# Patient Record
Sex: FEMALE | Race: WHITE
[De-identification: names, ages, dates, MRNs, and addresses within clinical notes are randomized per-mention and may not be internally consistent; named-entity substitution may affect disease eponyms.]

---

## 2017-03-02 ENCOUNTER — RECORDS - HEALTHEAST (OUTPATIENT)
Dept: ADMINISTRATIVE | Facility: OTHER | Age: 22
End: 2017-03-02

## 2018-05-29 ENCOUNTER — TRANSFERRED RECORDS (OUTPATIENT)
Dept: HEALTH INFORMATION MANAGEMENT | Facility: CLINIC | Age: 23
End: 2018-05-29

## 2018-05-29 LAB
C TRACH DNA SPEC QL PROBE+SIG AMP: NEGATIVE
CHOLEST SERPL-MCNC: 173 MG/DL (ref 100–199)
CREAT SERPL-MCNC: 0.82 MG/DL (ref 0.57–1.11)
GFR SERPL CREATININE-BSD FRML MDRD: >60 ML/MIN/1.73M2
GLUCOSE SERPL-MCNC: 88 MG/DL (ref 65–100)
HDLC SERPL-MCNC: 42 MG/DL
LDLC SERPL CALC-MCNC: 112 MG/DL
N GONORRHOEA DNA SPEC QL PROBE+SIG AMP: NEGATIVE
NONHDLC SERPL-MCNC: 131 MG/DL
POTASSIUM SERPL-SCNC: 4.1 MMOL/L (ref 3.5–5)
SPECIMEN DESCRIP: NORMAL
SPECIMEN DESCRIPTION: NORMAL
TRIGL SERPL-MCNC: 95 MG/DL

## 2019-06-06 NOTE — PROGRESS NOTES
SUBJECTIVE:   CC: Saritha Abel is an 23 year old woman who presents for preventive health visit.     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  Fam hx of breast cancer: no.   Colonoscopy: n/a. Fam hx of colon cancer: no  Pap: Last: 02/2017. Hx of abnormal: no  Sexually active: last 1 year ago. STD screening: yes- done 05/2018- negative.     Periods/menopause/contraception: OCP for years.   No LMP recorded (lmp unknown). (Menstrual status: Birth Control). Takes continuously, plans period every 9 weeks. 5 days bleeding. No bad cramps.   PCB, pelvic pain, dyspareunia, vaginal discharge: no  Some itching externally at times- sporadic.     Interested in the Mirena IUD.     Hx of anxiety   Used to be lexapro and then changed to sertraline.   Stopped due to side effects.  A few things in life are more calm. So weaned off and doing well off. Off past 5 months.   Now she is sleeping well. Mood is good. Sometimes irritable at times..     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   Is in school at [employer]  Needs TB blood test today- requesting that be drawn.    For exercise: lifting weights 3-5d per week, some cardio; feels good with exercise. Limitations with exercise due to: nothing. Denies CV sxs with exercise including CP, SOB, palpitations, CRANE, presyncope.    Lipids: ALlina 05/29/2018      Today's PHQ-2 Score:   PHQ-2 ( 1999 Pfizer) 6/10/2019   Q1: Little interest or pleasure in doing  things 0   Q2: Feeling down, depressed or hopeless 0   PHQ-2 Score 0   Q1: Little interest or pleasure in doing things Not at all   Q2: Feeling down, depressed or hopeless Not at all   PHQ-2 Score 0       Abuse: Current or Past(Physical, Sexual or Emotional)- No  Do you feel safe in your environment? Yes    Social History     Tobacco Use     Smoking status: Never Smoker     Smokeless tobacco: Never Used   Substance Use Topics     Alcohol use: Yes     Comment: 1 drink per week          Alcohol Use 6/10/2019   Prescreen: >3 drinks/day or >7 drinks/week? No       Reviewed orders with patient.  Reviewed health maintenance and updated orders accordingly - Yes  Lab work is in process  Labs reviewed in EPIC  BP Readings from Last 3 Encounters:   06/10/19 106/58    Wt Readings from Last 3 Encounters:   06/10/19 65.6 kg (144 lb 11.2 oz)                  There is no problem list on file for this patient.    Past Surgical History:   Procedure Laterality Date     DENTAL SURGERY      wisdom teeth       Social History     Tobacco Use     Smoking status: Never Smoker     Smokeless tobacco: Never Used   Substance Use Topics     Alcohol use: Yes     Comment: 1 drink per week      Family History   Problem Relation Age of Onset     Liver Disease Paternal Grandfather      Diabetes No family hx of      Hypertension No family hx of          Current Outpatient Medications   Medication Sig Dispense Refill     ALAYCEN 1/35 1-35 MG-MCG tablet Take 1 tablet by mouth daily To take continuously 112 tablet 4     Allergies   Allergen Reactions     Amoxicillin      Yeast infections.       Mammogram not appropriate for this patient based on age.    Pertinent mammograms are reviewed under the imaging tab.  History of abnormal Pap smear: NO - age 21-29 PAP every 3 years recommended     Reviewed and updated as needed this visit by clinical staff  Tobacco  Allergies  Meds  Med Hx  Surg Hx  Fam Hx  Soc Hx        Reviewed and updated as needed this  "visit by Provider          Review of Systems   Constitutional: Negative for chills and fever.   HENT: Negative for congestion, ear pain, hearing loss and sore throat.    Eyes: Negative for pain and visual disturbance.   Respiratory: Negative for cough and shortness of breath.    Cardiovascular: Negative for chest pain, palpitations and peripheral edema.   Gastrointestinal: Negative for abdominal pain, constipation, diarrhea, heartburn, hematochezia and nausea.   Breasts:  Negative for tenderness, breast mass and discharge.   Genitourinary: Negative for dysuria, frequency, genital sores, hematuria, pelvic pain, urgency, vaginal bleeding and vaginal discharge.   Musculoskeletal: Negative for arthralgias, joint swelling and myalgias.   Skin: Negative for rash.   Neurological: Negative for dizziness, weakness, headaches and paresthesias.   Psychiatric/Behavioral: Negative for mood changes. The patient is nervous/anxious.         OBJECTIVE:   /58   Pulse 79   Temp 98.7  F (37.1  C) (Oral)   Resp 18   Ht 1.638 m (5' 4.5\")   Wt 65.6 kg (144 lb 11.2 oz)   LMP  (LMP Unknown)   SpO2 98%   Breastfeeding? No   BMI 24.45 kg/m    Physical Exam  GENERAL: healthy, alert and no distress  EYES: Eyes grossly normal to inspection, PERRL and conjunctivae and sclerae normal  HENT: ear canals and TM's normal, nose and mouth without ulcers or lesions  NECK: no adenopathy, no asymmetry, masses, or scars and thyroid normal to palpation  RESP: lungs clear to auscultation - no rales, rhonchi or wheezes  BREAST: nipple piercing bilaterally without erythema, crusting or signs of infection; normal without masses, tenderness or nipple discharge and no palpable axillary masses or adenopathy  CV: regular rate and rhythm, normal S1 S2, no S3 or S4, no murmur, click or rub, no peripheral edema and peripheral pulses strong  ABDOMEN: soft, nontender, no hepatosplenomegaly, no masses and bowel sounds normal   (female): normal female " external genitalia, normal urethral meatus, vaginal mucosa pink, moist, well rugated, and normal cervix/adnexa/uterus without masses or discharge  MS: no gross musculoskeletal defects noted, no edema  SKIN: no suspicious lesions or rashes  NEURO: Normal strength and tone, mentation intact and speech normal  PSYCH: mentation appears normal, affect normal/bright  LYMPH: normal ant/post cervical, supraclavicular nodes    Diagnostic Test Results:  Results for orders placed or performed in visit on 06/10/19 (from the past 24 hour(s))   Hemoglobin   Result Value Ref Range    Hemoglobin 13.6 11.7 - 15.7 g/dL   Wet prep   Result Value Ref Range    Specimen Description Vagina     Wet Prep No Trichomonas seen     Wet Prep Clue cells seen  Few   (A)     Wet Prep No yeast seen     Wet Prep WBC'S seen  Few          ASSESSMENT/PLAN:   1. Routine general medical examination at a health care facility  Pap and STD screening as below  Counseling as below  - NEISSERIA GONORRHOEA PCR  - CHLAMYDIA TRACHOMATIS PCR  - HIV Screening  - Pap imaged thin layer screen only - recommended age 21 - 24 years  - Treponema Abs w Reflex to RPR and Titer  - Hemoglobin  - Wet prep    2. Screening for malignant neoplasm of cervix  Obtained today   - Pap imaged thin layer screen only - recommended age 21 - 24 years    3. Screening for hyperlipidemia  Reviewed lipids from 05/28/2018 from her previous [facility].     4. Screen for STD (sexually transmitted disease)  Safe sex practices discussed and advised. STD screening as below.   - NEISSERIA GONORRHOEA PCR  - CHLAMYDIA TRACHOMATIS PCR  - HIV Screening  - Treponema Abs w Reflex to RPR and Titer  - Wet prep    5. Encounter for surveillance of contraceptive pills  Refilled OCP. Risks vs benefits. Taking continuously.  Pt interested in Mirena IUD as alternate- discussed IUD, insertion, timing for insertion.   - ALAYCEN 1/35 1-35 MG-MCG tablet; Take 1 tablet by mouth daily To take continuously  Dispense:  "112 tablet; Refill: 4    6. Tuberculosis screening  Pt needs for work as LPN.   She will bring in form to be completed   - Quantiferon TB Gold Plus    7. BV (bacterial vaginosis)  Treat as below  - metroNIDAZOLE (FLAGYL) 500 MG tablet; Take 1 tablet (500 mg) by mouth 2 times daily for 7 days  Dispense: 14 tablet; Refill: 0      COUNSELING:  Reviewed preventive health counseling, as reflected in patient instructions       Regular exercise       Healthy diet/nutrition       Contraception       Safe sex practices/STD prevention    Estimated body mass index is 24.45 kg/m  as calculated from the following:    Height as of this encounter: 1.638 m (5' 4.5\").    Weight as of this encounter: 65.6 kg (144 lb 11.2 oz).         reports that she has never smoked. She has never used smokeless tobacco.      Counseling Resources:  ATP IV Guidelines  Pooled Cohorts Equation Calculator  Breast Cancer Risk Calculator  FRAX Risk Assessment  ICSI Preventive Guidelines  Dietary Guidelines for Americans, 2010  [employer]ero's MyPlate  ASA Prophylaxis  Lung CA Screening    Lavinia Donohue PA-C  [facility]  "

## 2019-06-10 ENCOUNTER — OFFICE VISIT (OUTPATIENT)
Dept: FAMILY MEDICINE | Facility: CLINIC | Age: 24
End: 2019-06-10
Payer: COMMERCIAL

## 2019-06-10 VITALS
BODY MASS INDEX: 24.11 KG/M2 | OXYGEN SATURATION: 98 % | RESPIRATION RATE: 18 BRPM | TEMPERATURE: 98.7 F | HEART RATE: 79 BPM | WEIGHT: 144.7 LBS | HEIGHT: 65 IN | SYSTOLIC BLOOD PRESSURE: 106 MMHG | DIASTOLIC BLOOD PRESSURE: 58 MMHG

## 2019-06-10 DIAGNOSIS — N76.0 BV (BACTERIAL VAGINOSIS): ICD-10-CM

## 2019-06-10 DIAGNOSIS — Z13.220 SCREENING FOR HYPERLIPIDEMIA: ICD-10-CM

## 2019-06-10 DIAGNOSIS — Z00.00 ROUTINE GENERAL MEDICAL EXAMINATION AT A HEALTH CARE FACILITY: Primary | ICD-10-CM

## 2019-06-10 DIAGNOSIS — Z30.41 ENCOUNTER FOR SURVEILLANCE OF CONTRACEPTIVE PILLS: ICD-10-CM

## 2019-06-10 DIAGNOSIS — Z12.4 SCREENING FOR MALIGNANT NEOPLASM OF CERVIX: ICD-10-CM

## 2019-06-10 DIAGNOSIS — B96.89 BV (BACTERIAL VAGINOSIS): ICD-10-CM

## 2019-06-10 DIAGNOSIS — Z11.1 TUBERCULOSIS SCREENING: ICD-10-CM

## 2019-06-10 DIAGNOSIS — Z11.3 SCREEN FOR STD (SEXUALLY TRANSMITTED DISEASE): ICD-10-CM

## 2019-06-10 LAB
HGB BLD-MCNC: 13.6 G/DL (ref 11.7–15.7)
SPECIMEN SOURCE: ABNORMAL
WET PREP SPEC: ABNORMAL

## 2019-06-10 PROCEDURE — 87491 CHLMYD TRACH DNA AMP PROBE: CPT | Performed by: PHYSICIAN ASSISTANT

## 2019-06-10 PROCEDURE — 87210 SMEAR WET MOUNT SALINE/INK: CPT | Performed by: PHYSICIAN ASSISTANT

## 2019-06-10 PROCEDURE — 86481 TB AG RESPONSE T-CELL SUSP: CPT | Performed by: PHYSICIAN ASSISTANT

## 2019-06-10 PROCEDURE — 36415 COLL VENOUS BLD VENIPUNCTURE: CPT | Performed by: PHYSICIAN ASSISTANT

## 2019-06-10 PROCEDURE — G0145 SCR C/V CYTO,THINLAYER,RESCR: HCPCS | Performed by: PHYSICIAN ASSISTANT

## 2019-06-10 PROCEDURE — 86780 TREPONEMA PALLIDUM: CPT | Performed by: PHYSICIAN ASSISTANT

## 2019-06-10 PROCEDURE — 85018 HEMOGLOBIN: CPT | Performed by: PHYSICIAN ASSISTANT

## 2019-06-10 PROCEDURE — 99385 PREV VISIT NEW AGE 18-39: CPT | Performed by: PHYSICIAN ASSISTANT

## 2019-06-10 PROCEDURE — 87389 HIV-1 AG W/HIV-1&-2 AB AG IA: CPT | Performed by: PHYSICIAN ASSISTANT

## 2019-06-10 PROCEDURE — 87591 N.GONORRHOEAE DNA AMP PROB: CPT | Performed by: PHYSICIAN ASSISTANT

## 2019-06-10 RX ORDER — NORETHINDRONE AND ETHINYL ESTRADIOL 1 MG-35MCG
KIT ORAL
Refills: 4 | COMMUNITY
Start: 2019-03-23 | End: 2019-06-10

## 2019-06-10 RX ORDER — NORETHINDRONE AND ETHINYL ESTRADIOL 1 MG-35MCG
1 KIT ORAL DAILY
Qty: 112 TABLET | Refills: 4 | Status: SHIPPED | OUTPATIENT
Start: 2019-06-10 | End: 2020-06-16

## 2019-06-10 ASSESSMENT — ENCOUNTER SYMPTOMS
FEVER: 0
PARESTHESIAS: 0
DIARRHEA: 0
JOINT SWELLING: 0
FREQUENCY: 0
PALPITATIONS: 0
EYE PAIN: 0
MYALGIAS: 0
CONSTIPATION: 0
DYSURIA: 0
NAUSEA: 0
HEMATURIA: 0
CHILLS: 0
HEARTBURN: 0
SORE THROAT: 0
HEADACHES: 0
SHORTNESS OF BREATH: 0
NERVOUS/ANXIOUS: 1
COUGH: 0
BREAST MASS: 0
ABDOMINAL PAIN: 0
DIZZINESS: 0
ARTHRALGIAS: 0
WEAKNESS: 0
HEMATOCHEZIA: 0

## 2019-06-10 ASSESSMENT — PAIN SCALES - GENERAL: PAINLEVEL: NO PAIN (0)

## 2019-06-10 ASSESSMENT — MIFFLIN-ST. JEOR: SCORE: 1404.29

## 2019-06-10 NOTE — PATIENT INSTRUCTIONS
IUD- Leonard Ortiz DNP for insertion when in day 1-2 of period.    STD screening   Preventive Health Recommendations  Female Ages 21 to 25     Yearly exam:     See your health care provider every year in order to  o Review health changes.   o Discuss preventive care.    o Review your medicines if your doctor has prescribed any.      You should be tested each year for STDs (sexually transmitted diseases).       Talk to your provider about how often you should have cholesterol testing.      Get a Pap test every three years. If you have an abnormal result, your doctor may have you test more often.      If you are at risk for diabetes, you should have a diabetes test (fasting glucose).     Shots:     Get a flu shot each year.     Get a tetanus shot every 10 years.     Consider getting the shot (vaccine) that prevents cervical cancer (Gardasil).    Nutrition:     Eat at least 5 servings of fruits and vegetables each day.    Eat whole-grain bread, whole-wheat pasta and brown rice instead of white grains and rice.    Get adequate Calcium and Vitamin D.     Lifestyle    Exercise at least 150 minutes a week each week (30 minutes a day, 5 days a week). This will help you control your weight and prevent disease.    Limit alcohol to one drink per day.    No smoking.     Wear sunscreen to prevent skin cancer.    See your dentist every six months for an exam and cleaning.

## 2019-06-10 NOTE — Clinical Note
Please abstract the following data from this visit with this patient into the appropriate field in Epic:Lipids: 05/29/2018 at [facility]- can see in [facility] EveryWhere

## 2019-06-11 ENCOUNTER — TELEPHONE (OUTPATIENT)
Dept: FAMILY MEDICINE | Facility: CLINIC | Age: 24
End: 2019-06-11

## 2019-06-11 LAB
C TRACH DNA SPEC QL NAA+PROBE: NEGATIVE
HIV 1+2 AB+HIV1 P24 AG SERPL QL IA: NONREACTIVE
N GONORRHOEA DNA SPEC QL NAA+PROBE: NEGATIVE
SPECIMEN SOURCE: NORMAL
SPECIMEN SOURCE: NORMAL
T PALLIDUM AB SER QL: NONREACTIVE

## 2019-06-11 RX ORDER — METRONIDAZOLE 500 MG/1
500 TABLET ORAL 2 TIMES DAILY
Qty: 14 TABLET | Refills: 0 | Status: SHIPPED | OUTPATIENT
Start: 2019-06-11 | End: 2019-08-14

## 2019-06-11 NOTE — TELEPHONE ENCOUNTER
Left message asking patient to return call.  Please inform patient of RESULTS from Provider below.     STD screening is negative - chlamydia, gonorrhea, HIV, syphilis.  Vaginal swab showed over growth of bacteria, or bacterial vaginosis. This is not an STD. It is treated w/oral antibiotic metronidazole twice daily for 7 days (will send to pharmacy). Do not consume any alcohol while taking this antibiotic.   Hemoglobin is normal.  TB blood test and pap still in process.   Lavinia Donohue PA-C

## 2019-06-12 LAB
COPATH REPORT: NORMAL
GAMMA INTERFERON BACKGROUND BLD IA-ACNC: 0.03 IU/ML
M TB IFN-G BLD-IMP: NEGATIVE
M TB IFN-G CD4+ BCKGRND COR BLD-ACNC: 7.33 IU/ML
MITOGEN IGNF BCKGRD COR BLD-ACNC: 0 IU/ML
MITOGEN IGNF BCKGRD COR BLD-ACNC: 0 IU/ML
PAP: NORMAL

## 2019-06-17 ENCOUNTER — MYC MEDICAL ADVICE (OUTPATIENT)
Dept: FAMILY MEDICINE | Facility: CLINIC | Age: 24
End: 2019-06-17

## 2019-06-17 DIAGNOSIS — B37.31 YEAST INFECTION OF THE VAGINA: Primary | ICD-10-CM

## 2019-06-18 RX ORDER — FLUCONAZOLE 150 MG/1
150 TABLET ORAL ONCE
Qty: 1 TABLET | Refills: 0 | Status: SHIPPED | OUTPATIENT
Start: 2019-06-18 | End: 2019-08-14

## 2019-08-12 NOTE — PROGRESS NOTES
Subjective     Saritha Abel is a 23 year old female who presents to clinic today for the following health issues:    HPI   Vaginal Symptoms  Onset: ongoing, was treated but never went away   Was seen 06/2019 for annual. Treated for BV   No new partners. Last intercourse 1 yr ago.   Itching inside right and thinks can feel a bump where the itching is.  No itch on outside.   No discharge.  Changed detergent to sensitive skin.   Tried looser underclothing  Stopped shaving and that didn't help.   Periods regular. Patient's last menstrual period was 07/16/2019 (approximate).   No urinary sx.   Had hpv vaccines    Description:  Vaginal Discharge: none   Itching (Pruritis): YES  Burning sensation:  YES  Odor: no     Accompanying Signs & Symptoms:  Pain with Urination: no   Abdominal Pain: no   Fever: no     History:   Sexually active: not currently- been over a year   New Partner: no   Possibility of Pregnancy:  No    Precipitating factors:   Recent Antibiotic Use: YES- about two months ago.     Alleviating factors:  none    Therapies Tried and outcome: completed course of antibiotic, change underwear, changed degerntant      There is no problem list on file for this patient.    Past Surgical History:   Procedure Laterality Date     DENTAL SURGERY      wisdom teeth       Social History     Tobacco Use     Smoking status: Never Smoker     Smokeless tobacco: Never Used   Substance Use Topics     Alcohol use: Yes     Comment: 1 drink per week      Family History   Problem Relation Age of Onset     Liver Disease Paternal Grandfather      Diabetes No family hx of      Hypertension No family hx of          Current Outpatient Medications   Medication Sig Dispense Refill     ALAYCEN 1/35 1-35 MG-MCG tablet Take 1 tablet by mouth daily To take continuously 112 tablet 4     imiquimod (ALDARA) 5 % external cream Apply topically three times a week At bedtime, wash off in morning. Use up to 16 weeks. 8 packet 1     Allergies  "  Allergen Reactions     Amoxicillin      Yeast infections.     BP Readings from Last 3 Encounters:   08/14/19 100/64   06/10/19 106/58    Wt Readings from Last 3 Encounters:   08/14/19 64.9 kg (143 lb)   06/10/19 65.6 kg (144 lb 11.2 oz)                    Reviewed and updated as needed this visit by Provider         Review of Systems   ROS COMP: Constitutional, HEENT, cardiovascular, pulmonary, gi and gu systems are negative, except as otherwise noted.      Objective    /64   Pulse 66   Temp 98.5  F (36.9  C) (Oral)   Resp 16   Ht 1.64 m (5' 4.57\")   Wt 64.9 kg (143 lb)   LMP 07/16/2019 (Approximate)   SpO2 100%   BMI 24.12 kg/m    There is no height or weight on file to calculate BMI.  Physical Exam   GENERAL: healthy, alert and no distress  ABDOMEN: soft, nontender, no masses and bowel sounds normal   (female): normal female external genitalia, normal labia; vaginal introitus: mucosal surface just right off the posterior midline - 1mm papule at site of itching. Otherwise normal tissue. Normal urethral meatus, vaginal mucosa pink, moist, well rugated, and normal cervix/adnexa/uterus without masses or discharge  NEURO: Normal strength and tone, mentation intact and speech normal  PSYCH: mentation appears normal, affect normal/bright    Diagnostic Test Results:  Labs reviewed in Epic  Results for orders placed or performed in visit on 08/14/19 (from the past 24 hour(s))   Wet prep   Result Value Ref Range    Specimen Description Vagina     Wet Prep No Trichomonas seen     Wet Prep No clue cells seen     Wet Prep No yeast seen     Wet Prep No WBC's seen            Assessment & Plan     1. Skin lesion  Itchy papular skin lesion at vaginal introitus  Treat for genital wart as below. Discussed how to use, apply, possible side effects. If not resolving as expected to GYN.   - imiquimod (ALDARA) 5 % external cream; Apply topically three times a week At bedtime, wash off in morning. Use up to 16 weeks.  " Dispense: 8 packet; Refill: 1    2. Vaginal itching  - Wet prep       Follow Up: The patient was instructed to contact clinic for worsening symptoms, non-improvement as expected/discussed, and for questions regarding medications or treatment plan. Discussed parameters for follow up and included in After Visit Summary given to patient.      Return in about 3 months (around 11/14/2019) for with gyn if not resolving or changing/growing.    Lavinia Donohue PA-C  [facility]

## 2019-08-14 ENCOUNTER — OFFICE VISIT (OUTPATIENT)
Dept: FAMILY MEDICINE | Facility: CLINIC | Age: 24
End: 2019-08-14
Payer: COMMERCIAL

## 2019-08-14 VITALS
RESPIRATION RATE: 16 BRPM | HEIGHT: 65 IN | OXYGEN SATURATION: 100 % | TEMPERATURE: 98.5 F | HEART RATE: 66 BPM | DIASTOLIC BLOOD PRESSURE: 64 MMHG | WEIGHT: 143 LBS | SYSTOLIC BLOOD PRESSURE: 100 MMHG | BODY MASS INDEX: 23.82 KG/M2

## 2019-08-14 DIAGNOSIS — L98.9 SKIN LESION: Primary | ICD-10-CM

## 2019-08-14 DIAGNOSIS — N89.8 VAGINAL ITCHING: ICD-10-CM

## 2019-08-14 LAB
SPECIMEN SOURCE: NORMAL
WET PREP SPEC: NORMAL

## 2019-08-14 PROCEDURE — 99213 OFFICE O/P EST LOW 20 MIN: CPT | Performed by: PHYSICIAN ASSISTANT

## 2019-08-14 PROCEDURE — 87210 SMEAR WET MOUNT SALINE/INK: CPT | Performed by: PHYSICIAN ASSISTANT

## 2019-08-14 RX ORDER — IMIQUIMOD 12.5 MG/.25G
CREAM TOPICAL
Qty: 8 PACKET | Refills: 1 | Status: SHIPPED | OUTPATIENT
Start: 2019-08-14

## 2019-08-14 ASSESSMENT — MIFFLIN-ST. JEOR: SCORE: 1397.64

## 2019-08-14 ASSESSMENT — PAIN SCALES - GENERAL: PAINLEVEL: NO PAIN (0)

## 2019-08-14 NOTE — PATIENT INSTRUCTIONS
Treat with the aldara as discussed 3 days per week  Can cause some local irritation at application site  Can use up to 16 weeks (stop once clear)  Condoms if active

## 2020-06-16 DIAGNOSIS — Z30.41 ENCOUNTER FOR SURVEILLANCE OF CONTRACEPTIVE PILLS: ICD-10-CM

## 2020-06-16 RX ORDER — NORETHINDRONE AND ETHINYL ESTRADIOL 1 MG-35MCG
KIT ORAL
Qty: 112 TABLET | Refills: 0 | Status: SHIPPED | OUTPATIENT
Start: 2020-06-16

## 2020-06-16 NOTE — TELEPHONE ENCOUNTER
Prescription approved per [facility] Refill Protocol.  Nick Sr, RN, BSN

## 2020-09-04 DIAGNOSIS — Z30.41 ENCOUNTER FOR SURVEILLANCE OF CONTRACEPTIVE PILLS: ICD-10-CM

## 2020-09-09 NOTE — TELEPHONE ENCOUNTER
Pending Prescriptions:                       Disp   Refills    ALAYCEN 1/35 1-35 MG-MCG tablet [Pharmacy *112 ta*0        Sig: TAKE 1 TABLET BY MOUTH ONCE DAILY.TAKE CONTINUOUSLY      Support Staff:   Please call patient to schedule a visit. (F2F, Video, Phone or E-visit) physical and med check   Then ask if patient will need a refill of the above medication before the visit.   Please re-flag for RN and then route to refill pool to give a 30 or 90 day darnell to get them to their visit or to remove the medication and to close the encounter.    After 3 attempts to reach patient, please route to provider to review and advise.    Thank you!    Nick Sr, BSN, RN, PHN

## 2020-09-15 RX ORDER — NORETHINDRONE AND ETHINYL ESTRADIOL 1 MG-35MCG
KIT ORAL
Qty: 112 TABLET | Refills: 0 | OUTPATIENT
Start: 2020-09-15

## 2020-09-15 NOTE — TELEPHONE ENCOUNTER
Spoke with pt and she said to disregard as she is switching clinics and this was sent to us in error. PCP updated. RN please remove medication and close.    Jennifer Vale CMA ([facility])

## 2021-01-03 ENCOUNTER — HEALTH MAINTENANCE LETTER (OUTPATIENT)
Age: 26
End: 2021-01-03

## 2021-10-10 ENCOUNTER — HEALTH MAINTENANCE LETTER (OUTPATIENT)
Age: 26
End: 2021-10-10

## 2022-01-30 ENCOUNTER — HEALTH MAINTENANCE LETTER (OUTPATIENT)
Age: 27
End: 2022-01-30

## 2022-09-18 ENCOUNTER — HEALTH MAINTENANCE LETTER (OUTPATIENT)
Age: 27
End: 2022-09-18

## 2023-05-07 ENCOUNTER — HEALTH MAINTENANCE LETTER (OUTPATIENT)
Age: 28
End: 2023-05-07